# Patient Record
Sex: MALE | Race: WHITE | NOT HISPANIC OR LATINO | Employment: OTHER | URBAN - METROPOLITAN AREA
[De-identification: names, ages, dates, MRNs, and addresses within clinical notes are randomized per-mention and may not be internally consistent; named-entity substitution may affect disease eponyms.]

---

## 2023-08-08 ENCOUNTER — MEDICAL CORRESPONDENCE (OUTPATIENT)
Dept: HEALTH INFORMATION MANAGEMENT | Facility: CLINIC | Age: 68
End: 2023-08-08
Payer: MEDICARE

## 2023-08-08 ENCOUNTER — TRANSFERRED RECORDS (OUTPATIENT)
Dept: HEALTH INFORMATION MANAGEMENT | Facility: CLINIC | Age: 68
End: 2023-08-08
Payer: MEDICARE

## 2023-08-08 LAB
ALT SERPL-CCNC: 66 U/L
AST SERPL-CCNC: 77 U/L (ref 17–59)
CHOLESTEROL (EXTERNAL): 256 MG/DL (ref 0–200)
CREATININE (EXTERNAL): 0.5 MG/DL (ref 0.7–1.4)
GFR ESTIMATED (EXTERNAL): >60 ML/MIN/1.73M2
GLUCOSE (EXTERNAL): 95 MG/DL (ref 60–99)
HDLC SERPL-MCNC: 100 MG/DL (ref 35–65)
LDL CHOLESTEROL CALCULATED (EXTERNAL): 140 MG/DL (ref 0–130)
POTASSIUM (EXTERNAL): 4.4 MMOL/L (ref 3.5–5.1)
TRIGLYCERIDES (EXTERNAL): 80 MG/DL (ref 0–200)

## 2023-08-14 ENCOUNTER — OFFICE VISIT (OUTPATIENT)
Dept: CARDIOLOGY | Facility: CLINIC | Age: 68
End: 2023-08-14
Payer: MEDICARE

## 2023-08-14 VITALS
BODY MASS INDEX: 25.87 KG/M2 | HEIGHT: 72 IN | HEART RATE: 68 BPM | RESPIRATION RATE: 16 BRPM | SYSTOLIC BLOOD PRESSURE: 120 MMHG | WEIGHT: 191 LBS | DIASTOLIC BLOOD PRESSURE: 60 MMHG

## 2023-08-14 DIAGNOSIS — E78.5 HYPERLIPIDEMIA LDL GOAL <100: ICD-10-CM

## 2023-08-14 DIAGNOSIS — R06.09 DYSPNEA ON EXERTION: Primary | ICD-10-CM

## 2023-08-14 PROCEDURE — 99204 OFFICE O/P NEW MOD 45 MIN: CPT | Performed by: INTERNAL MEDICINE

## 2023-08-14 RX ORDER — ATORVASTATIN CALCIUM 40 MG/1
40 TABLET, FILM COATED ORAL DAILY
COMMUNITY
Start: 2023-08-10

## 2023-08-14 RX ORDER — MULTIVITAMIN WITH IRON
1 TABLET ORAL DAILY
COMMUNITY

## 2023-08-14 RX ORDER — METOPROLOL SUCCINATE 25 MG/1
25 TABLET, EXTENDED RELEASE ORAL DAILY
COMMUNITY
Start: 2023-08-08

## 2023-08-14 NOTE — LETTER
8/14/2023    [facility] [address]  [place] [address]    RE: Josh Swan       Dear Colleague,     I had the pleasure of seeing Josh Swan in the [facility].    HEART CARE ENCOUNTER CONSULTATON NOTE      [facility]  [phone]      Assessment/Recommendations   Assessment/Plan:  1.  Patient reports dyspnea on exertion gives the example of climbing stairs as well as some tightness in his chest.  He does not believe that the symptom has been clearly progressive.  He does endorse that walking up a flight of stairs resulted in some shortness of breath and vague heaviness in his chest.  We are going to plan an exercise stress echocardiogram as well as a coronary calcium score.  He is asked to notify us if he has worsening symptoms.    2.  Epigastric discomfort.  He describes to me some epigastric discomfort that occurs on a persistent basis that waxes and wanes and is worse with eating and swallowing.  He states that frequently he has to vomit in order for this symptom to feel better.  Would wonder if this is more gastrointestinal and would requested his primary care provider consider GI consultation.  This symptom has been present for some time and is perhaps the most bothersome symptom to the patient that he describes today.  He reports that he has not had an EGD in the past.  Consideration may be given to Pepcid and Prilosec but we will plan to get additional input from his primary care provider.    3.  Risk modification.  Patient's blood pressure was reportedly elevated when he saw his primary care physician and has been placed on metoprolol 25 mg daily.  Today's blood pressure is 120/60 and heart rate of 68 and will asked that he monitor his blood pressure.    4.  Hyperlipidemia.  Lipid results recently obtained finds a total cholesterol that was elevated at 256 with an LDL of 140.  In addition AST and ALT were mildly elevated.  It appears  that he was started on atorvastatin 40 mg daily by his primary care provider.  I asked that he consider curtailing and discontinuing alcohol for 1 month.  We also discussed coronary calcium scoring to further define degree of coronary calcification.    5.  History of by report of SVT.  This is in the remote past.  No recurrence of SVT for a number of years.  He is asked to monitor for any increasing symptoms.      Plan 1.  Exercise stress echocardiogram  2.  Coronary calcium scoring  3.  We will ask primary care physician to consider more formal GI work-up and possible GI consultation as well as follow-up liver function test.  4.  Patient is asked to abstain from alcohol over the next 1 month and would recommend close monitoring of liver tests with his primary care provider.  5.  Follow-up from a cardiovascular standpoint pending the above.      ECG from August 8, 2023 revealed sinus rhythm, incomplete right bundle branch block otherwise normal-appearing EKG.       History of Present Illness/Subjective    HPI: Josh Swan is a 68 year old male new patient to me today.  Chart notes are reviewed.  Patient reported symptoms of shortness of breath and chest discomfort.  Reported history of remote SVT.  Patient reported symptoms of chest discomfort and shortness of breath at his recent evaluation by his primary care physician.  Chest tightness was reported worse with activity and noted symptoms walking upstairs he feels more short of breath.  He reportedly has a history of Lyme's.  In the chart records recent blood work includes from August 8 a glucose of 95, sodium 138, potassium of 4.4, chloride 104, BUN of 8, creatinine 0.5, AST elevated 77, ALT of 66 cholesterol total is elevated 256, HDL of 100, LDL of 140, lipase of 264 cholesterol HDL ratio is 3.  White count of 4, hemoglobin 16.5, hematocrit 46.7, platelets of 146.      Patient tells me that he has experienced some dyspnea on exertion that has been present for a  few years and has not really progressed.  He does not describe any clear-cut anginal type chest discomfort with exertion but rather dyspnea.  He does however describe epigastric discomfort which is present the midportion of the time and is worse with eating and he states that at times after he swallows he feels as if he has to vomit in order to relieve the pressure.  This is a symptom has been present over the past number of months.  There is no exertional epigastric discomfort and is quite active in his job which does require him to lift relatively heavy objects and is able to mow the lawn.    A number of years ago he tells me that he was evaluated in the [place] ER for rapid heart rate of 220 bpm and was given a medication that resolved this symptom.  He reports he has not had similar symptoms in the last 5 to 6 years.    Cardiovascular risk factors are pertinent for prior tobacco use quitting in 1978, 2 alcoholic beverages per day, hyperlipidemia as outlined above, negative for diabetes, negative for premature family history of heart disease.        ECG reviewed from outside records August 8, 2023 poor fax copy, sinus rhythm, incomplete right bundle branch block             Physical Examination  Review of Systems   Vitals: 120/60, weight 191 pounds heart rate of 68 and regular  Wt Readings from Last 3 Encounters:   No data found for Wt       General Appearance:   no distress, normal body habitus   ENT/Mouth: membranes moist, no oral lesions or bleeding gums.      EYES:  no scleral icterus, normal conjunctivae   Neck: no carotid bruits   Chest/Lungs:   lungs are clear to auscultation, no rales or wheezing, equal chest wall expansion    Cardiovascular:   Regular. Normal first and second heart sounds with no murmurs, rubs, or gallops; the carotid, radial and posterior tibial pulses are intact, Jugular venous pressure within normal limits, no edema bilaterally    Abdomen:  no bruits, or tenderness; bowel sounds are  present   Extremities: no cyanosis or clubbing   Skin: no xanthelasma, warm.    Neurologic:  no tremors     Psychiatric: alert and oriented x3, calm        Please refer above for cardiac ROS details.        Medical History  Surgical History Family History Social History   Past Medical History:  Right hip replacement  Atopic dermatitis  Basal cell adenoma  History of Lyme's.  Hypertension  Intention tremor     Colonoscopy  Total hip replacement No family history on file.     Social History     Socioeconomic History    Marital status:      Spouse name: Not on file    Number of children: Not on file    Years of education: Not on file    Highest education level: Not on file   Occupational History    Not on file   Tobacco Use    Smoking status: Not on file    Smokeless tobacco: Not on file   Substance and Sexual Activity    Alcohol use: Not on file    Drug use: Not on file    Sexual activity: Not on file   Other Topics Concern    Not on file   Social History Narrative    Not on file     Social Determinants of Health     Financial Resource Strain: Not on file   Food Insecurity: Not on file   Transportation Needs: Not on file   Physical Activity: Not on file   Stress: Not on file   Social Connections: Not on file   Intimate Partner Violence: Not on file   Housing Stability: Not on file           Medications  Allergies   No current outpatient medications on file.     Not on File       Lab Results    Chemistry/lipid CBC Cardiac Enzymes/BNP/TSH/INR   No results for input(s): CHOL, HDL, LDL, TRIG, CHOLHDLRATIO in the last 23002 hours.  No results for input(s): LDL in the last 36154 hours.  No results for input(s): NA, POTASSIUM, CHLORIDE, CO2, GLC, BUN, CR, GFRESTIMATED, UZIEL in the last 29287 hours.    Invalid input(s): GRFESTBLACK  No results for input(s): CR in the last 62715 hours.  No results for input(s): A1C in the last 49540 hours.       No results for input(s): WBC, HGB, HCT, MCV, PLT in the last 40841  hours.  No results for input(s): HGB in the last 46760 hours. No results for input(s): TROPONINI in the last 67269 hours.  No results for input(s): BNP, NTBNPI, NTBNP in the last 49290 hours.  No results for input(s): TSH in the last 75507 hours.  No results for input(s): INR in the last 26466 hours.     Ivan Cisneros MD        Thank you for allowing me to participate in the care of your patient.      Sincerely,     Ivan Cisneros MD     [facility]  cc:   No referring provider defined for this encounter.

## 2023-08-14 NOTE — PROGRESS NOTES
HEART CARE ENCOUNTER CONSULTATON NOTE      [facility]  [phone]      Assessment/Recommendations   Assessment/Plan:  1.  Patient reports dyspnea on exertion gives the example of climbing stairs as well as some tightness in his chest.  He does not believe that the symptom has been clearly progressive.  He does endorse that walking up a flight of stairs resulted in some shortness of breath and vague heaviness in his chest.  We are going to plan an exercise stress echocardiogram as well as a coronary calcium score.  He is asked to notify us if he has worsening symptoms.    2.  Epigastric discomfort.  He describes to me some epigastric discomfort that occurs on a persistent basis that waxes and wanes and is worse with eating and swallowing.  He states that frequently he has to vomit in order for this symptom to feel better.  Would wonder if this is more gastrointestinal and would requested his primary care provider consider GI consultation.  This symptom has been present for some time and is perhaps the most bothersome symptom to the patient that he describes today.  He reports that he has not had an EGD in the past.  Consideration may be given to Pepcid and Prilosec but we will plan to get additional input from his primary care provider.    3.  Risk modification.  Patient's blood pressure was reportedly elevated when he saw his primary care physician and has been placed on metoprolol 25 mg daily.  Today's blood pressure is 120/60 and heart rate of 68 and will asked that he monitor his blood pressure.    4.  Hyperlipidemia.  Lipid results recently obtained finds a total cholesterol that was elevated at 256 with an LDL of 140.  In addition AST and ALT were mildly elevated.  It appears that he was started on atorvastatin 40 mg daily by his primary care provider.  I asked that he consider curtailing and discontinuing alcohol for 1 month.  We also discussed coronary calcium scoring to further define  degree of coronary calcification.    5.  History of by report of SVT.  This is in the remote past.  No recurrence of SVT for a number of years.  He is asked to monitor for any increasing symptoms.      Plan 1.  Exercise stress echocardiogram  2.  Coronary calcium scoring  3.  We will ask primary care physician to consider more formal GI work-up and possible GI consultation as well as follow-up liver function test.  4.  Patient is asked to abstain from alcohol over the next 1 month and would recommend close monitoring of liver tests with his primary care provider.  5.  Follow-up from a cardiovascular standpoint pending the above.      ECG from August 8, 2023 revealed sinus rhythm, incomplete right bundle branch block otherwise normal-appearing EKG.       History of Present Illness/Subjective    HPI: Josh Swan is a 68 year old male new patient to me today.  Chart notes are reviewed.  Patient reported symptoms of shortness of breath and chest discomfort.  Reported history of remote SVT.  Patient reported symptoms of chest discomfort and shortness of breath at his recent evaluation by his primary care physician.  Chest tightness was reported worse with activity and noted symptoms walking upstairs he feels more short of breath.  He reportedly has a history of Lyme's.  In the chart records recent blood work includes from August 8 a glucose of 95, sodium 138, potassium of 4.4, chloride 104, BUN of 8, creatinine 0.5, AST elevated 77, ALT of 66 cholesterol total is elevated 256, HDL of 100, LDL of 140, lipase of 264 cholesterol HDL ratio is 3.  White count of 4, hemoglobin 16.5, hematocrit 46.7, platelets of 146.      Patient tells me that he has experienced some dyspnea on exertion that has been present for a few years and has not really progressed.  He does not describe any clear-cut anginal type chest discomfort with exertion but rather dyspnea.  He does however describe epigastric discomfort which is present the  midportion of the time and is worse with eating and he states that at times after he swallows he feels as if he has to vomit in order to relieve the pressure.  This is a symptom has been present over the past number of months.  There is no exertional epigastric discomfort and is quite active in his job which does require him to lift relatively heavy objects and is able to mow the lawn.    A number of years ago he tells me that he was evaluated in the [place] ER for rapid heart rate of 220 bpm and was given a medication that resolved this symptom.  He reports he has not had similar symptoms in the last 5 to 6 years.    Cardiovascular risk factors are pertinent for prior tobacco use quitting in 1978, 2 alcoholic beverages per day, hyperlipidemia as outlined above, negative for diabetes, negative for premature family history of heart disease.        ECG reviewed from outside records August 8, 2023 poor fax copy, sinus rhythm, incomplete right bundle branch block             Physical Examination  Review of Systems   Vitals: 120/60, weight 191 pounds heart rate of 68 and regular  Wt Readings from Last 3 Encounters:   No data found for Wt       General Appearance:   no distress, normal body habitus   ENT/Mouth: membranes moist, no oral lesions or bleeding gums.      EYES:  no scleral icterus, normal conjunctivae   Neck: no carotid bruits   Chest/Lungs:   lungs are clear to auscultation, no rales or wheezing, equal chest wall expansion    Cardiovascular:   Regular. Normal first and second heart sounds with no murmurs, rubs, or gallops; the carotid, radial and posterior tibial pulses are intact, Jugular venous pressure within normal limits, no edema bilaterally    Abdomen:  no bruits, or tenderness; bowel sounds are present   Extremities: no cyanosis or clubbing   Skin: no xanthelasma, warm.    Neurologic:  no tremors     Psychiatric: alert and oriented x3, calm        Please refer above for cardiac ROS details.         Medical History  Surgical History Family History Social History   Past Medical History:  Right hip replacement  Atopic dermatitis  Basal cell adenoma  History of Lyme's.  Hypertension  Intention tremor     Colonoscopy  Total hip replacement No family history on file.     Social History     Socioeconomic History    Marital status:      Spouse name: Not on file    Number of children: Not on file    Years of education: Not on file    Highest education level: Not on file   Occupational History    Not on file   Tobacco Use    Smoking status: Not on file    Smokeless tobacco: Not on file   Substance and Sexual Activity    Alcohol use: Not on file    Drug use: Not on file    Sexual activity: Not on file   Other Topics Concern    Not on file   Social History Narrative    Not on file     Social Determinants of Health     Financial Resource Strain: Not on file   Food Insecurity: Not on file   Transportation Needs: Not on file   Physical Activity: Not on file   Stress: Not on file   Social Connections: Not on file   Intimate Partner Violence: Not on file   Housing Stability: Not on file           Medications  Allergies   No current outpatient medications on file.     Not on File       Lab Results    Chemistry/lipid CBC Cardiac Enzymes/BNP/TSH/INR   No results for input(s): CHOL, HDL, LDL, TRIG, CHOLHDLRATIO in the last 61819 hours.  No results for input(s): LDL in the last 89165 hours.  No results for input(s): NA, POTASSIUM, CHLORIDE, CO2, GLC, BUN, CR, GFRESTIMATED, UZIEL in the last 99563 hours.    Invalid input(s): GRFESTBLACK  No results for input(s): CR in the last 13941 hours.  No results for input(s): A1C in the last 13607 hours.       No results for input(s): WBC, HGB, HCT, MCV, PLT in the last 58715 hours.  No results for input(s): HGB in the last 87698 hours. No results for input(s): TROPONINI in the last 68410 hours.  No results for input(s): BNP, NTBNPI, NTBNP in the last 53599 hours.  No results for  input(s): TSH in the last 58665 hours.  No results for input(s): INR in the last 28920 hours.     Ivan Cisneros MD

## 2023-08-14 NOTE — PATIENT INSTRUCTIONS
Nice to meet you today.We talked about the shortness of breath with exertion and some chest discomfort and will plan an exercise stress ultrasound asap.We also discussed a 5 minute CT picture of the heart blood vessels to determine the amount of plaque and then make decisions about cholesterol management.Please make a follow up appointment with your primary physician regarding the liver test abnormalities and consideration of seeing the stomach doctor.Please consider avoiding alcohol for a month and having the liver tests repeated.My nurse is Marjorie and her number is [phone]

## 2023-08-18 ENCOUNTER — HOSPITAL ENCOUNTER (OUTPATIENT)
Dept: CT IMAGING | Facility: CLINIC | Age: 68
Discharge: HOME | End: 2023-08-18
Attending: INTERNAL MEDICINE | Admitting: INTERNAL MEDICINE
Payer: MEDICARE

## 2023-08-18 DIAGNOSIS — E78.5 HYPERLIPIDEMIA LDL GOAL <100: ICD-10-CM

## 2023-08-18 PROCEDURE — 75571 CT HRT W/O DYE W/CA TEST: CPT | Mod: 26 | Performed by: GENERAL ACUTE CARE HOSPITAL

## 2023-08-18 PROCEDURE — G1010 CDSM STANSON: HCPCS

## 2023-08-18 PROCEDURE — G1010 CDSM STANSON: HCPCS | Performed by: GENERAL ACUTE CARE HOSPITAL

## 2023-08-19 LAB
CV CALCIUM SCORE AGATSTON LM: 0
CV CALCIUM SCORING AGATSON LAD: 723
CV CALCIUM SCORING AGATSTON CX: 173
CV CALCIUM SCORING AGATSTON RCA: 1273
CV CALCIUM SCORING AGATSTON TOTAL: 2169

## 2023-08-21 DIAGNOSIS — R07.89 CHEST DISCOMFORT: ICD-10-CM

## 2023-08-21 DIAGNOSIS — R93.1 AGATSTON CORONARY ARTERY CALCIUM SCORE GREATER THAN 400: Primary | ICD-10-CM

## 2023-08-21 RX ORDER — ASPIRIN 81 MG/1
81 TABLET, CHEWABLE ORAL DAILY
COMMUNITY
Start: 2023-08-21

## 2023-08-21 NOTE — RESULT ENCOUNTER NOTE
Significant increase in the calcium score, I had ordered a stress echo not completed yet looks like 8/23, how quickly would we be able to do a ct angio if ordered asap, I sent him a my chart note  mdg

## 2023-08-23 ENCOUNTER — HOSPITAL ENCOUNTER (OUTPATIENT)
Dept: CT IMAGING | Facility: CLINIC | Age: 68
Discharge: HOME | End: 2023-08-23
Attending: INTERNAL MEDICINE | Admitting: INTERNAL MEDICINE
Payer: MEDICARE

## 2023-08-23 VITALS
BODY MASS INDEX: 24.92 KG/M2 | HEIGHT: 72 IN | DIASTOLIC BLOOD PRESSURE: 65 MMHG | SYSTOLIC BLOOD PRESSURE: 118 MMHG | WEIGHT: 184 LBS

## 2023-08-23 DIAGNOSIS — R93.1 AGATSTON CORONARY ARTERY CALCIUM SCORE GREATER THAN 400: ICD-10-CM

## 2023-08-23 DIAGNOSIS — R07.89 CHEST DISCOMFORT: ICD-10-CM

## 2023-08-23 DIAGNOSIS — R07.89 CHEST DISCOMFORT: Primary | ICD-10-CM

## 2023-08-23 DIAGNOSIS — R06.09 DYSPNEA ON EXERTION: ICD-10-CM

## 2023-08-23 LAB
BSA FOR ECHO PROCEDURE: 0 M2
CREAT BLD-MCNC: 0.8 MG/DL (ref 0.7–1.3)
GFR SERPL CREATININE-BSD FRML MDRD: >60 ML/MIN/1.73M2

## 2023-08-23 PROCEDURE — 250N000013 HC RX MED GY IP 250 OP 250 PS 637: Performed by: INTERNAL MEDICINE

## 2023-08-23 PROCEDURE — G1010 CDSM STANSON: HCPCS

## 2023-08-23 PROCEDURE — 82565 ASSAY OF CREATININE: CPT

## 2023-08-23 PROCEDURE — 75574 CT ANGIO HRT W/3D IMAGE: CPT | Mod: MG

## 2023-08-23 PROCEDURE — G1010 CDSM STANSON: HCPCS | Performed by: INTERNAL MEDICINE

## 2023-08-23 PROCEDURE — 75574 CT ANGIO HRT W/3D IMAGE: CPT | Mod: 26 | Performed by: INTERNAL MEDICINE

## 2023-08-23 PROCEDURE — 250N000011 HC RX IP 250 OP 636: Mod: JZ | Performed by: INTERNAL MEDICINE

## 2023-08-23 RX ORDER — NITROGLYCERIN 0.4 MG/1
0.4 TABLET SUBLINGUAL ONCE
Status: COMPLETED | OUTPATIENT
Start: 2023-08-23 | End: 2023-08-23

## 2023-08-23 RX ORDER — IOPAMIDOL 755 MG/ML
100 INJECTION, SOLUTION INTRAVASCULAR ONCE
Status: COMPLETED | OUTPATIENT
Start: 2023-08-23 | End: 2023-08-23

## 2023-08-23 RX ORDER — LIDOCAINE 40 MG/G
CREAM TOPICAL
Status: DISCONTINUED | OUTPATIENT
Start: 2023-08-23 | End: 2023-08-24 | Stop reason: HOSPADM

## 2023-08-23 RX ORDER — METOPROLOL TARTRATE 1 MG/ML
5 INJECTION, SOLUTION INTRAVENOUS
Status: DISCONTINUED | OUTPATIENT
Start: 2023-08-23 | End: 2023-08-24 | Stop reason: HOSPADM

## 2023-08-23 RX ORDER — DILTIAZEM HYDROCHLORIDE 5 MG/ML
5 INJECTION INTRAVENOUS
Status: DISCONTINUED | OUTPATIENT
Start: 2023-08-23 | End: 2023-08-24 | Stop reason: HOSPADM

## 2023-08-23 RX ORDER — DILTIAZEM HYDROCHLORIDE 5 MG/ML
10 INJECTION INTRAVENOUS
Status: DISCONTINUED | OUTPATIENT
Start: 2023-08-23 | End: 2023-08-24 | Stop reason: HOSPADM

## 2023-08-23 RX ADMIN — IOPAMIDOL 100 ML: 755 INJECTION, SOLUTION INTRAVENOUS at 08:39

## 2023-08-23 RX ADMIN — NITROGLYCERIN 0.4 MG: 0.4 TABLET SUBLINGUAL at 08:39

## 2023-08-23 NOTE — PROGRESS NOTES
Echo ordered.  -[facility]    ----- Message -----  From: Ivan Cisneros MD  Sent: 8/23/2023   2:59 PM CDT  To: Marjorie Perez RN    CT results reviewed.  There is mild plaque with mild narrowing in the blood vessels of the heart with no serious narrowing.  I will send him a MyChart note.  I have recommended that he continue with aggressive risk modification.  He needs a follow-up lipid, follow-up liver tests as they were elevated prior to starting the atorvastatin by his primary care physician and needs to follow-up with his primary care physician about a GI cause of his symptoms.  I wrote him a MyChart note.  I would still like him to have a resting echocardiogram if we can arrange this and I did put this in the chart note.  Thanks KORINA Cisneros the CT also does report fatty liver and I did comment about this as well.

## 2023-08-23 NOTE — RESULT ENCOUNTER NOTE
CT results reviewed.  There is mild plaque with mild narrowing in the blood vessels of the heart with no serious narrowing.  I will send him a MyChart note.  I have recommended that he continue with aggressive risk modification.  He needs a follow-up lipid, follow-up liver tests as they were elevated prior to starting the atorvastatin by his primary care physician and needs to follow-up with his primary care physician about a GI cause of his symptoms.  I wrote him a MyChart note.  I would still like him to have a resting echocardiogram if we can arrange this and I did put this in the chart note.  Thanks KORINA Cisneros the CT also does report fatty liver and I did comment about this as well.

## 2023-09-26 ENCOUNTER — HOSPITAL ENCOUNTER (OUTPATIENT)
Dept: CARDIOLOGY | Facility: CLINIC | Age: 68
Discharge: HOME | End: 2023-09-26
Attending: INTERNAL MEDICINE | Admitting: INTERNAL MEDICINE
Payer: MEDICARE

## 2023-09-26 DIAGNOSIS — R07.89 CHEST DISCOMFORT: ICD-10-CM

## 2023-09-26 DIAGNOSIS — R06.09 DYSPNEA ON EXERTION: ICD-10-CM

## 2023-09-26 PROCEDURE — 93306 TTE W/DOPPLER COMPLETE: CPT | Mod: 26 | Performed by: INTERNAL MEDICINE

## 2023-09-26 PROCEDURE — 93306 TTE W/DOPPLER COMPLETE: CPT

## 2023-09-27 NOTE — RESULT ENCOUNTER NOTE
Normal appearing echocardiogram, recent ct angio results reviewed my chart note sent  mdg needs follow up lipids, ast and alt

## 2023-10-07 ENCOUNTER — HEALTH MAINTENANCE LETTER (OUTPATIENT)
Age: 68
End: 2023-10-07

## 2024-11-24 ENCOUNTER — HEALTH MAINTENANCE LETTER (OUTPATIENT)
Age: 69
End: 2024-11-24